# Patient Record
Sex: MALE | Race: WHITE | NOT HISPANIC OR LATINO | Employment: OTHER | URBAN - METROPOLITAN AREA
[De-identification: names, ages, dates, MRNs, and addresses within clinical notes are randomized per-mention and may not be internally consistent; named-entity substitution may affect disease eponyms.]

---

## 2023-07-26 ENCOUNTER — HOSPITAL ENCOUNTER (EMERGENCY)
Facility: HOSPITAL | Age: 73
Discharge: HOME | End: 2023-07-26
Attending: EMERGENCY MEDICINE
Payer: MEDICARE

## 2023-07-26 VITALS
HEART RATE: 56 BPM | SYSTOLIC BLOOD PRESSURE: 112 MMHG | BODY MASS INDEX: 25.14 KG/M2 | OXYGEN SATURATION: 100 % | RESPIRATION RATE: 18 BRPM | HEIGHT: 63 IN | DIASTOLIC BLOOD PRESSURE: 57 MMHG | TEMPERATURE: 97 F | WEIGHT: 141.88 LBS

## 2023-07-26 DIAGNOSIS — K94.23 MALFUNCTION OF PERCUTANEOUS ENDOSCOPIC GASTROSTOMY (PEG) TUBE: ICD-10-CM

## 2023-07-26 DIAGNOSIS — Z43.1 PEG (PERCUTANEOUS ENDOSCOPIC GASTROSTOMY) ADJUSTMENT/REPLACEMENT/REMOVAL: ICD-10-CM

## 2023-07-26 PROCEDURE — 99284 EMERGENCY DEPT VISIT MOD MDM: CPT | Mod: ER,25

## 2023-07-26 PROCEDURE — 43762 RPLC GTUBE NO REVJ TRC: CPT | Mod: ER

## 2023-07-26 RX ORDER — ASPIRIN 81 MG/1
81 TABLET ORAL DAILY
COMMUNITY

## 2023-07-26 RX ORDER — GUAIFENESIN 600 MG/1
600 TABLET, EXTENDED RELEASE ORAL 2 TIMES DAILY PRN
COMMUNITY

## 2023-07-26 RX ORDER — IBUPROFEN/PSEUDOEPHEDRINE HCL 200MG-30MG
6 TABLET ORAL
COMMUNITY

## 2023-07-26 RX ORDER — METOCLOPRAMIDE 5 MG/1
5 TABLET ORAL 2 TIMES DAILY
COMMUNITY

## 2023-07-26 RX ORDER — POLYVINYL ALCOHOL 14 MG/ML
SOLUTION/ DROPS OPHTHALMIC
COMMUNITY
Start: 2023-04-20

## 2023-07-26 NOTE — ED PROVIDER NOTES
History     Chief Complaint   Patient presents with    Peg Tube      Peg tube replacement, per nurse tube is leaking and possibly broken , nurse deny signs of infection ad or fever , pt deny pain or discomfort      HPI:  Mono Becker is a 72 y.o. male with PMH as below who presents to the [facility] emergency department for evaluation of malfunctioning PEG tube.  It has been leaking for 2 days.      External medical record reviewed: Note from [facility] on 4/29/2022 shows PEG tube placed by Dr. Sherlyn Gongora, general surgery, on 04/29/2022. .    PCP: Dr Lema    Review of patient's allergies indicates:  No Known Allergies   Past Medical History:   Diagnosis Date    Squamous cell skin cancer, face      Past Surgical History:   Procedure Laterality Date    CREATION OF FREE FLAP USING LATISSIMUS DORSI Right     EXCISION OF PAROTID GLAND      GASTROSTOMY      HERNIA REPAIR      Ventral       History reviewed. No pertinent family history.  Social History     Tobacco Use    Smoking status: Former     Types: Cigarettes    Smokeless tobacco: Not on file   Substance and Sexual Activity    Alcohol use: Not Currently    Drug use: Not on file    Sexual activity: Not on file      Review of Systems     Review of Systems   Constitutional:  Negative for fever.   Respiratory:  Negative for shortness of breath.    Cardiovascular:  Negative for chest pain.   Gastrointestinal:  Negative for abdominal pain, constipation, diarrhea, nausea and vomiting.   Neurological:  Negative for dizziness.      Physical Exam     Initial Vitals [07/26/23 1151]   BP Pulse Resp Temp SpO2   (!) 112/57 (!) 56 18 97.4 °F (36.3 °C) 100 %      MAP       --          Nursing notes and vital signs reviewed.  Constitutional: Patient is in no acute distress.   Head: Atraumatic.   Eyes:  Flap over right eye.   ENT: Mucous membranes moist.   Neck: Supple.   Cardiovascular: Regular rate. Regular rhythm.   Pulmonary: No respiratory distress.   Abdominal:  "Non-distended.  Peg tube to left upper quadrant.  Large soft reducible hernia to right lower quadrant.  No tenderness, rebound, guarding, rigidity.  Musculoskeletal: Moves all extremities. No obvious deformities.   Skin: Warm and dry.   Neurological:  Alert, awake, and appropriate. Normal speech. No acute lateralizing neurologic deficits appreciated.   Psychiatric: Normal affect.       ED Course   Feeding Tube    Date/Time: 7/26/2023 2:19 PM  Location procedure was performed: [facility] EMERGENCY DEPARTMENT  Performed by: Kim Curry DO  Authorized by: Kim Curry DO   Consent: Verbal consent obtained.  Risks and benefits: risks, benefits and alternatives were discussed  Consent given by: patient  Patient identity confirmed: verbally with patient  Indications: tube malfunction    Sedation:  Patient sedated: no    Local anesthesia used: no    Anesthesia:  Local anesthesia used: no  Tube type: gastrostomy  Patient position: supine  Procedure type: replacement  Tube size: 24 Fr  Endoscope used: no  Bulb inflation volume: 20 (ml)  Bulb inflation fluid: normal saline  Placement/position confirmation: contrast and x-ray  Tube placement difficulty: none  Complications: No  Patient tolerance: patient tolerated the procedure well with no immediate complications      Vitals:    07/26/23 1151   BP: (!) 112/57   Pulse: (!) 56   Resp: 18   Temp: 97.4 °F (36.3 °C)   TempSrc: Oral   SpO2: 100%   Weight: 64.4 kg (141 lb 13.9 oz)   Height: 5' 3" (1.6 m)     Lab Results Interpreted as Abnormal:  Labs Reviewed - No data to display   All Lab Results:  No results found for this or any previous visit.  Imaging Results              XR Gastric tube check, non-radiologist performed (Final result)  Result time 07/26/23 14:36:06      Final result by Kenji Guzmán MD (07/26/23 14:36:06)                   Impression:    FINDINGS/  Images were obtained through existing G-tube which demonstrate contrast within the stomach and proximal " small bowel.  No extraluminal contrast is seen.  Total fluoro time was 0.1 minutes and 1 fluoroscopic images were obtained.      Electronically signed by: Kenji Guzmán MD  Date:    07/26/2023  Time:    14:36               Narrative:    EXAMINATION:  XR GASTRIC TUBE CHECK, NON-RADIOLOGIST PERFORMED    CLINICAL HISTORY:  PEG tube replacement;    COMPARISON:  07/26/2023                                       X-Ray Abdomen AP 1 View (KUB) (Final result)  Result time 07/26/23 14:04:56      Final result by Ian Arriaga MD (07/26/23 14:04:56)                   Impression:      As above.      Electronically signed by: Ian Arriaga MD  Date:    07/26/2023  Time:    14:04               Narrative:    EXAMINATION:  XR ABDOMEN AP 1 VIEW    CLINICAL HISTORY:  - Gastrostomy malfunction.    TECHNIQUE:  AP abdomen    COMPARISON:  None    FINDINGS:  Large amount of stool in the colon.  Gastrostomy tube overlies the left upper quadrant.                                     ED Physician's independent review of the above imaging: agree with radiologist, abdominal x-ray shows gastrostomy tube.  Repeat x-ray shows gastrostomy tube with contrast in the stomach and small bowel with no extravasation consistent with appropriate placement.      The emergency physician reviewed the vital signs and test results, which are outlined above.     ED Discussion     72-year-old male presents with leaking from his PEG tube.  It was placed in April of 2022.  It was successfully removed and replaced without complication.  Confirmation of placement was performed with x-ray contrast.             ED Medication(s) Administered:  Medications - No data to display    Prescription Management: I performed a review of the patient's current Rx medication list as input by nursing staff.    Patient's Medications   New Prescriptions    No medications on file   Previous Medications    ASPIRIN (ECOTRIN) 81 MG EC TABLET    81 mg once daily. Per G tube    GUAIFENESIN  (MUCINEX) 600 MG 12 HR TABLET    600 mg 2 (two) times daily as needed for Congestion.    MELATONIN 3 MG TBDL    6 mg by PEG Tube route.    METOCLOPRAMIDE HCL (REGLAN) 5 MG TABLET    5 mg by Per G Tube route 2 (two) times a day.    POLYVINYL ALCOHOL, ARTIFICIAL TEARS, (LIQUIFILM TEARS) 1.4 % OPHTHALMIC SOLUTION    Instill 2 drops in left eye once a day   Modified Medications    No medications on file   Discontinued Medications    No medications on file         Follow-up Information       Sherlyn Gongora MD In 2 days.    Specialty: Surgery  Contact information:  [address]  [address]  [place] [facility] [place] [facility] [address]  [phone]               Michael Lema MD In 2 days.    Specialty: Internal Medicine  Contact information:  [address]  [address] [facility] [address]  [phone]                            Clinical Impression       ICD-10-CM ICD-9-CM   1. Malfunction of percutaneous endoscopic gastrostomy (PEG) tube  K94.23 536.42   2. PEG (percutaneous endoscopic gastrostomy) adjustment/replacement/removal  Z43.1 V55.1   3. PEG (percutaneous endoscopic gastrostomy) adjustment/replacement/removal  Z43.1 V55.1      ED Disposition Condition    Discharge Stable               Kim Curry, DO  07/26/23 3311

## 2023-11-13 ENCOUNTER — HOSPITAL ENCOUNTER (EMERGENCY)
Facility: HOSPITAL | Age: 73
Discharge: HOME | End: 2023-11-13
Attending: EMERGENCY MEDICINE
Payer: MEDICARE

## 2023-11-13 VITALS
HEART RATE: 76 BPM | WEIGHT: 140 LBS | TEMPERATURE: 98 F | DIASTOLIC BLOOD PRESSURE: 54 MMHG | BODY MASS INDEX: 24.8 KG/M2 | SYSTOLIC BLOOD PRESSURE: 102 MMHG | OXYGEN SATURATION: 96 % | RESPIRATION RATE: 18 BRPM

## 2023-11-13 DIAGNOSIS — L03.211 FACIAL CELLULITIS: Primary | ICD-10-CM

## 2023-11-13 LAB
ALBUMIN SERPL BCP-MCNC: 2.7 G/DL (ref 3.5–5.2)
ALP SERPL-CCNC: 110 U/L (ref 55–135)
ALT SERPL W/O P-5'-P-CCNC: 13 U/L (ref 10–44)
ANION GAP SERPL CALC-SCNC: 13 MMOL/L (ref 8–16)
AST SERPL-CCNC: 19 U/L (ref 10–40)
BASOPHILS # BLD AUTO: 0.05 K/UL (ref 0–0.2)
BASOPHILS NFR BLD: 0.3 % (ref 0–1.9)
BILIRUB SERPL-MCNC: 0.4 MG/DL (ref 0.1–1)
BUN SERPL-MCNC: 22 MG/DL (ref 8–23)
CALCIUM SERPL-MCNC: 9.3 MG/DL (ref 8.7–10.5)
CHLORIDE SERPL-SCNC: 97 MMOL/L (ref 95–110)
CO2 SERPL-SCNC: 22 MMOL/L (ref 23–29)
CREAT SERPL-MCNC: 0.8 MG/DL (ref 0.5–1.4)
DIFFERENTIAL METHOD: ABNORMAL
EOSINOPHIL # BLD AUTO: 1.2 K/UL (ref 0–0.5)
EOSINOPHIL NFR BLD: 7.5 % (ref 0–8)
ERYTHROCYTE [DISTWIDTH] IN BLOOD BY AUTOMATED COUNT: 20 % (ref 11.5–14.5)
EST. GFR  (NO RACE VARIABLE): >60 ML/MIN/1.73 M^2
GLUCOSE SERPL-MCNC: 126 MG/DL (ref 70–110)
HCT VFR BLD AUTO: 34.2 % (ref 40–54)
HCV AB SERPL QL IA: NEGATIVE
HEP C VIRUS HOLD SPECIMEN: NORMAL
HGB BLD-MCNC: 10.8 G/DL (ref 14–18)
HIV 1+2 AB+HIV1 P24 AG SERPL QL IA: NEGATIVE
IMM GRANULOCYTES # BLD AUTO: 0.07 K/UL (ref 0–0.04)
IMM GRANULOCYTES NFR BLD AUTO: 0.4 % (ref 0–0.5)
LYMPHOCYTES # BLD AUTO: 1.2 K/UL (ref 1–4.8)
LYMPHOCYTES NFR BLD: 7.3 % (ref 18–48)
MCH RBC QN AUTO: 27 PG (ref 27–31)
MCHC RBC AUTO-ENTMCNC: 31.6 G/DL (ref 32–36)
MCV RBC AUTO: 86 FL (ref 82–98)
MONOCYTES # BLD AUTO: 1.1 K/UL (ref 0.3–1)
MONOCYTES NFR BLD: 6.8 % (ref 4–15)
NEUTROPHILS # BLD AUTO: 12.7 K/UL (ref 1.8–7.7)
NEUTROPHILS NFR BLD: 77.7 % (ref 38–73)
NRBC BLD-RTO: 0 /100 WBC
PLATELET # BLD AUTO: 221 K/UL (ref 150–450)
PMV BLD AUTO: 10.1 FL (ref 9.2–12.9)
POTASSIUM SERPL-SCNC: 4.2 MMOL/L (ref 3.5–5.1)
PROT SERPL-MCNC: 6.9 G/DL (ref 6–8.4)
RBC # BLD AUTO: 4 M/UL (ref 4.6–6.2)
SODIUM SERPL-SCNC: 132 MMOL/L (ref 136–145)
WBC # BLD AUTO: 16.31 K/UL (ref 3.9–12.7)

## 2023-11-13 PROCEDURE — 25500020 PHARM REV CODE 255: Mod: ER | Performed by: EMERGENCY MEDICINE

## 2023-11-13 PROCEDURE — 86803 HEPATITIS C AB TEST: CPT | Performed by: EMERGENCY MEDICINE

## 2023-11-13 PROCEDURE — 99285 EMERGENCY DEPT VISIT HI MDM: CPT | Mod: 25,ER

## 2023-11-13 PROCEDURE — 87389 HIV-1 AG W/HIV-1&-2 AB AG IA: CPT | Performed by: EMERGENCY MEDICINE

## 2023-11-13 PROCEDURE — 80053 COMPREHEN METABOLIC PANEL: CPT | Mod: ER | Performed by: EMERGENCY MEDICINE

## 2023-11-13 PROCEDURE — 85025 COMPLETE CBC W/AUTO DIFF WBC: CPT | Mod: ER | Performed by: EMERGENCY MEDICINE

## 2023-11-13 RX ORDER — CLINDAMYCIN HYDROCHLORIDE 150 MG/1
450 CAPSULE ORAL EVERY 8 HOURS
Qty: 45 CAPSULE | Refills: 0 | Status: SHIPPED | OUTPATIENT
Start: 2023-11-13 | End: 2023-11-18

## 2023-11-13 RX ADMIN — IOHEXOL 75 ML: 350 INJECTION, SOLUTION INTRAVENOUS at 12:11

## 2023-11-13 NOTE — ED PROVIDER NOTES
Encounter Date: 11/13/2023       History     Chief Complaint   Patient presents with    Facial Swelling     Skin graft on face. Swelling. Started amoxicillin yesterday. Currently undergoing chemo treatment. Sent to er for face CT and bloodwork from nursing home.      Sent from the nursing home for swelling to the surgical site on the right side of his face    The history is provided by the patient.     Review of patient's allergies indicates:  No Known Allergies  Past Medical History:   Diagnosis Date    Squamous cell skin cancer, face      Past Surgical History:   Procedure Laterality Date    CREATION OF FREE FLAP USING LATISSIMUS DORSI Right     EXCISION OF PAROTID GLAND      GASTROSTOMY      HERNIA REPAIR      Ventral     No family history on file.  Social History     Tobacco Use    Smoking status: Former     Types: Cigarettes   Substance Use Topics    Alcohol use: Not Currently     Review of Systems   Constitutional:  Negative for fever.   HENT:  Negative for sore throat.    Respiratory:  Negative for shortness of breath.    Cardiovascular:  Negative for chest pain.   Gastrointestinal:  Negative for nausea.   Genitourinary:  Negative for dysuria.   Musculoskeletal:  Negative for back pain.   Skin:  Negative for rash.   Neurological:  Negative for weakness.   Hematological:  Does not bruise/bleed easily.       Physical Exam     Initial Vitals   BP Pulse Resp Temp SpO2   11/13/23 1142 11/13/23 1142 11/13/23 1142 11/13/23 1145 11/13/23 1142   (!) 102/54 76 18 97.5 °F (36.4 °C) 96 %      MAP       --                Physical Exam    Nursing note and vitals reviewed.  Constitutional: He appears well-developed and well-nourished. No distress.   HENT:   Head: Normocephalic.       Mouth/Throat: Oropharynx is clear and moist.   Surgical changes. N o swelling no warmth no erythema   Eyes: Conjunctivae and EOM are normal. Pupils are equal, round, and reactive to light.   Neck: Neck supple.   Normal range of  motion.  Cardiovascular:  Normal rate, regular rhythm and normal heart sounds.     Exam reveals no gallop and no friction rub.       No murmur heard.  Pulmonary/Chest: Breath sounds normal. No respiratory distress. He has no wheezes. He has no rhonchi. He has no rales.   Abdominal: Abdomen is soft. Bowel sounds are normal. He exhibits no distension and no mass. There is no abdominal tenderness. There is no rebound and no guarding.   Musculoskeletal:         General: No edema. Normal range of motion.      Cervical back: Normal range of motion and neck supple.     Neurological: He is alert and oriented to person, place, and time. He has normal strength.   Skin: Skin is warm and dry. No rash noted.   Psychiatric: He has a normal mood and affect. Thought content normal.         ED Course   Procedures  Labs Reviewed   CBC W/ AUTO DIFFERENTIAL - Abnormal; Notable for the following components:       Result Value    WBC 16.31 (*)     RBC 4.00 (*)     Hemoglobin 10.8 (*)     Hematocrit 34.2 (*)     MCHC 31.6 (*)     RDW 20.0 (*)     Gran # (ANC) 12.7 (*)     Immature Grans (Abs) 0.07 (*)     Mono # 1.1 (*)     Eos # 1.2 (*)     Gran % 77.7 (*)     Lymph % 7.3 (*)     All other components within normal limits    Narrative:     Release to patient->Immediate   COMPREHENSIVE METABOLIC PANEL - Abnormal; Notable for the following components:    Sodium 132 (*)     CO2 22 (*)     Glucose 126 (*)     Albumin 2.7 (*)     All other components within normal limits    Narrative:     Release to patient->Immediate   HIV 1 / 2 ANTIBODY   HEPATITIS C ANTIBODY   HEP C VIRUS HOLD SPECIMEN          Imaging Results              CT Maxillofacial With Contrast (Final result)  Result time 11/13/23 13:35:41      Final result by Igor Chan III, MD (11/13/23 13:35:41)                   Impression:      Postsurgical changes.    Findings which can be seen with cellulitis involving the right mandibular and buccal regions.    Soft tissue  opacity at the right apex and along the right carotid sheath, likely post therapeutic.  Underlying neoplasm not excluded.    No abscess identified.      Electronically signed by: Saeed Chan  Date:    11/13/2023  Time:    13:35               Narrative:    EXAMINATION:  CT MAXILLOFACIAL WITH CONTRAST    CLINICAL HISTORY:  Maxillary/facial abscess;    TECHNIQUE:  Postcontrast axial images obtained from the centrum semiovale, inferiorly down to the hyoid bone.  75 mL Omnipaque 350 was administered IV.    COMPARISON:  None    FINDINGS:  Extensive postsurgical changes involving the right orbit and right maxilla with suspected muscular flap placement.  The right globe and orbital contents have been resected.    There are some retained secretions within the pharynx.  Smooth soft tissue opacity at the apex of the right orbit, likely postsurgical.  Soft tissue density encases the right carotid bulb extending cephalad along the right ICA.  This may be postsurgical or secondary to radiation therapy.  Underlying neoplasm here is not excluded.  Comparison a prior studies would be beneficial.    There is some increased attenuation within the subcutaneous fat of the right mandibular region extending into the buccal region.  This can be seen with cellulitis.  No abscess or adenopathy identified.    There is cervical spondylosis.    No significant intracranial abnormality.                                       Medications   iohexoL (OMNIPAQUE 350) injection 75 mL (75 mLs Intravenous Given 11/13/23 1252)     Medical Decision Making  Facial swelling to surgical site  DDx: cellulitis, abscess    Problems Addressed:  Facial cellulitis: acute illness or injury    Amount and/or Complexity of Data Reviewed  Labs: ordered.  Radiology: ordered.     Details: No abscess. F indings suggestive of cellulitis    Risk  Prescription drug management.                               Clinical Impression:   Final diagnoses:  [L03.211] Facial  cellulitis (Primary)        ED Disposition Condition    Discharge Stable          ED Prescriptions       Medication Sig Dispense Start Date End Date Auth. Provider    clindamycin (CLEOCIN) 150 MG capsule Take 3 capsules (450 mg total) by mouth every 8 (eight) hours. for 5 days 45 capsule 11/13/2023 11/18/2023 Boogie Velasquez MD          Follow-up Information       Follow up With Specialties Details Why Contact Info    surgeon                 Boogie Velasquez MD  11/13/23 [id number]